# Patient Record
Sex: MALE | Race: OTHER | HISPANIC OR LATINO | Employment: FULL TIME | URBAN - METROPOLITAN AREA
[De-identification: names, ages, dates, MRNs, and addresses within clinical notes are randomized per-mention and may not be internally consistent; named-entity substitution may affect disease eponyms.]

---

## 2019-01-25 RX ORDER — GUANFACINE 1 MG/1
TABLET ORAL
COMMUNITY
End: 2019-01-28

## 2019-01-25 RX ORDER — METHYLPHENIDATE HYDROCHLORIDE 18 MG/1
TABLET ORAL
COMMUNITY
End: 2019-01-28 | Stop reason: CLARIF

## 2019-01-25 RX ORDER — KETOCONAZOLE 20 MG/G
CREAM TOPICAL DAILY
COMMUNITY
Start: 2013-07-02 | End: 2019-01-28

## 2019-01-28 ENCOUNTER — APPOINTMENT (OUTPATIENT)
Dept: LAB | Age: 24
End: 2019-01-28
Payer: COMMERCIAL

## 2019-01-28 ENCOUNTER — TRANSCRIBE ORDERS (OUTPATIENT)
Dept: ADMINISTRATIVE | Age: 24
End: 2019-01-28

## 2019-01-28 ENCOUNTER — OFFICE VISIT (OUTPATIENT)
Dept: INTERNAL MEDICINE CLINIC | Facility: CLINIC | Age: 24
End: 2019-01-28
Payer: COMMERCIAL

## 2019-01-28 VITALS
OXYGEN SATURATION: 98 % | DIASTOLIC BLOOD PRESSURE: 90 MMHG | WEIGHT: 209.4 LBS | HEART RATE: 88 BPM | HEIGHT: 71 IN | SYSTOLIC BLOOD PRESSURE: 110 MMHG | BODY MASS INDEX: 29.31 KG/M2 | TEMPERATURE: 98.1 F

## 2019-01-28 DIAGNOSIS — F84.0 AUTISM SPECTRUM DISORDER: ICD-10-CM

## 2019-01-28 DIAGNOSIS — Z01.84 IMMUNITY STATUS TESTING: ICD-10-CM

## 2019-01-28 DIAGNOSIS — K21.9 GASTROESOPHAGEAL REFLUX DISEASE, ESOPHAGITIS PRESENCE NOT SPECIFIED: ICD-10-CM

## 2019-01-28 DIAGNOSIS — E66.3 OVERWEIGHT: ICD-10-CM

## 2019-01-28 DIAGNOSIS — F90.2 ADHD (ATTENTION DEFICIT HYPERACTIVITY DISORDER), COMBINED TYPE: ICD-10-CM

## 2019-01-28 DIAGNOSIS — F31.63 BIPOLAR DISORDER, CURR EPISODE MIXED, SEVERE, W/O PSYCHOTIC FEATURES (HCC): ICD-10-CM

## 2019-01-28 DIAGNOSIS — R73.03 PREDIABETES: ICD-10-CM

## 2019-01-28 DIAGNOSIS — R73.03 PREDIABETES: Primary | ICD-10-CM

## 2019-01-28 PROBLEM — Z23 NEED FOR MMR VACCINE: Status: ACTIVE | Noted: 2019-01-28

## 2019-01-28 PROBLEM — Z76.89 ENCOUNTER TO ESTABLISH CARE: Status: ACTIVE | Noted: 2019-01-28

## 2019-01-28 LAB
ALBUMIN SERPL BCP-MCNC: 4.1 G/DL (ref 3.5–5)
ALP SERPL-CCNC: 67 U/L (ref 46–116)
ALT SERPL W P-5'-P-CCNC: 66 U/L (ref 12–78)
ANION GAP SERPL CALCULATED.3IONS-SCNC: 7 MMOL/L (ref 4–13)
AST SERPL W P-5'-P-CCNC: 25 U/L (ref 5–45)
BASOPHILS # BLD AUTO: 0.07 THOUSANDS/ΜL (ref 0–0.1)
BASOPHILS NFR BLD AUTO: 1 % (ref 0–1)
BILIRUB SERPL-MCNC: 0.45 MG/DL (ref 0.2–1)
BUN SERPL-MCNC: 17 MG/DL (ref 5–25)
CALCIUM SERPL-MCNC: 9.4 MG/DL (ref 8.3–10.1)
CHLORIDE SERPL-SCNC: 103 MMOL/L (ref 100–108)
CHOLEST SERPL-MCNC: 231 MG/DL (ref 50–200)
CO2 SERPL-SCNC: 27 MMOL/L (ref 21–32)
CREAT SERPL-MCNC: 1.05 MG/DL (ref 0.6–1.3)
EOSINOPHIL # BLD AUTO: 0.2 THOUSAND/ΜL (ref 0–0.61)
EOSINOPHIL NFR BLD AUTO: 3 % (ref 0–6)
ERYTHROCYTE [DISTWIDTH] IN BLOOD BY AUTOMATED COUNT: 11.9 % (ref 11.6–15.1)
EST. AVERAGE GLUCOSE BLD GHB EST-MCNC: 140 MG/DL
GFR SERPL CREATININE-BSD FRML MDRD: 101 ML/MIN/1.73SQ M
GLUCOSE P FAST SERPL-MCNC: 111 MG/DL (ref 65–99)
HBA1C MFR BLD: 6.5 % (ref 4.2–6.3)
HCT VFR BLD AUTO: 44.2 % (ref 36.5–49.3)
HDLC SERPL-MCNC: 44 MG/DL (ref 40–60)
HGB BLD-MCNC: 15 G/DL (ref 12–17)
IMM GRANULOCYTES # BLD AUTO: 0.01 THOUSAND/UL (ref 0–0.2)
IMM GRANULOCYTES NFR BLD AUTO: 0 % (ref 0–2)
LDLC SERPL CALC-MCNC: 157 MG/DL (ref 0–100)
LYMPHOCYTES # BLD AUTO: 1.99 THOUSANDS/ΜL (ref 0.6–4.47)
LYMPHOCYTES NFR BLD AUTO: 33 % (ref 14–44)
MCH RBC QN AUTO: 29.9 PG (ref 26.8–34.3)
MCHC RBC AUTO-ENTMCNC: 33.9 G/DL (ref 31.4–37.4)
MCV RBC AUTO: 88 FL (ref 82–98)
MONOCYTES # BLD AUTO: 0.59 THOUSAND/ΜL (ref 0.17–1.22)
MONOCYTES NFR BLD AUTO: 10 % (ref 4–12)
NEUTROPHILS # BLD AUTO: 3.17 THOUSANDS/ΜL (ref 1.85–7.62)
NEUTS SEG NFR BLD AUTO: 53 % (ref 43–75)
NONHDLC SERPL-MCNC: 187 MG/DL
NRBC BLD AUTO-RTO: 0 /100 WBCS
PLATELET # BLD AUTO: 253 THOUSANDS/UL (ref 149–390)
PMV BLD AUTO: 10.7 FL (ref 8.9–12.7)
POTASSIUM SERPL-SCNC: 3.9 MMOL/L (ref 3.5–5.3)
PROT SERPL-MCNC: 7.8 G/DL (ref 6.4–8.2)
RBC # BLD AUTO: 5.01 MILLION/UL (ref 3.88–5.62)
SODIUM SERPL-SCNC: 137 MMOL/L (ref 136–145)
TRIGL SERPL-MCNC: 152 MG/DL
TSH SERPL DL<=0.05 MIU/L-ACNC: 2.88 UIU/ML (ref 0.36–3.74)
WBC # BLD AUTO: 6.03 THOUSAND/UL (ref 4.31–10.16)

## 2019-01-28 PROCEDURE — 86704 HEP B CORE ANTIBODY TOTAL: CPT

## 2019-01-28 PROCEDURE — 80061 LIPID PANEL: CPT

## 2019-01-28 PROCEDURE — 85025 COMPLETE CBC W/AUTO DIFF WBC: CPT

## 2019-01-28 PROCEDURE — 84443 ASSAY THYROID STIM HORMONE: CPT

## 2019-01-28 PROCEDURE — 83036 HEMOGLOBIN GLYCOSYLATED A1C: CPT

## 2019-01-28 PROCEDURE — 86787 VARICELLA-ZOSTER ANTIBODY: CPT

## 2019-01-28 PROCEDURE — 99204 OFFICE O/P NEW MOD 45 MIN: CPT | Performed by: NURSE PRACTITIONER

## 2019-01-28 PROCEDURE — 86762 RUBELLA ANTIBODY: CPT

## 2019-01-28 PROCEDURE — 86765 RUBEOLA ANTIBODY: CPT

## 2019-01-28 PROCEDURE — 1036F TOBACCO NON-USER: CPT | Performed by: NURSE PRACTITIONER

## 2019-01-28 PROCEDURE — 86706 HEP B SURFACE ANTIBODY: CPT

## 2019-01-28 PROCEDURE — 3008F BODY MASS INDEX DOCD: CPT | Performed by: NURSE PRACTITIONER

## 2019-01-28 PROCEDURE — 80053 COMPREHEN METABOLIC PANEL: CPT

## 2019-01-28 PROCEDURE — 86735 MUMPS ANTIBODY: CPT

## 2019-01-28 PROCEDURE — 36415 COLL VENOUS BLD VENIPUNCTURE: CPT

## 2019-01-28 RX ORDER — ATOMOXETINE 40 MG/1
40 CAPSULE ORAL DAILY
Qty: 30 CAPSULE | Refills: 0 | Status: SHIPPED | OUTPATIENT
Start: 2019-01-28

## 2019-01-28 RX ORDER — RANITIDINE 150 MG/1
150 CAPSULE ORAL 2 TIMES DAILY
Qty: 60 CAPSULE | Refills: 0 | Status: SHIPPED | OUTPATIENT
Start: 2019-01-28

## 2019-01-28 RX ORDER — OLANZAPINE 20 MG/1
20 TABLET ORAL
COMMUNITY
End: 2019-01-28 | Stop reason: SDUPTHER

## 2019-01-28 RX ORDER — OLANZAPINE 20 MG/1
20 TABLET ORAL
Qty: 30 TABLET | Refills: 0 | Status: SHIPPED | OUTPATIENT
Start: 2019-01-28

## 2019-01-28 RX ORDER — OMEPRAZOLE 20 MG/1
20 CAPSULE, DELAYED RELEASE ORAL DAILY
Qty: 90 CAPSULE | Refills: 1 | Status: CANCELLED | OUTPATIENT
Start: 2019-01-28

## 2019-01-28 RX ORDER — OLANZAPINE 20 MG/1
20 TABLET ORAL
COMMUNITY
Start: 2016-07-18 | End: 2019-01-28

## 2019-01-28 RX ORDER — OMEPRAZOLE 20 MG/1
20 CAPSULE, DELAYED RELEASE ORAL DAILY
COMMUNITY
Start: 2018-03-27

## 2019-01-28 RX ORDER — ATOMOXETINE 40 MG/1
40 CAPSULE ORAL DAILY
COMMUNITY
Start: 2016-07-18 | End: 2019-01-28 | Stop reason: SDUPTHER

## 2019-01-28 NOTE — PATIENT INSTRUCTIONS
Will check labs  Refill meds    ADHD/Bipolar/Autism  Will help facilitate psych referral with  insurance  Continue with counseling     Pre-Diabetes  Will update labs  Will likely need to re-start metformin  Work on reducing sugar - sweets, soda, cookies    Reflux  Will stop omeprazole  Start zantac  Recommend small frequent meals throughout the day  Avoid aggravating foods - spicy foods, acidic foods - such as tomato and citrus  Avoid alcohol  Do not lay down for at least 30 mins after eating

## 2019-01-28 NOTE — PROGRESS NOTES
Assessment/Plan:    Pt is new to our practice  Here to establish care  He is brought in with his mother  He has a PMH of Autism, Bipolar, ADHD, pre-dm    ADHD/Bipolar/Autism  Stable  Will refill straterra and zyprexa temporarily until pt can be seen by Psych  Will help facilitate psych referral with  insurance  Continue with counseling     Pre-Diabetes  Last A1c 6 2  Has been off metformin for > 4 months  Will update labs  Will likely need to re-start metformin  Diet is poor  D/W pt and mom to work on reducing sugar - sweets, soda, cookies  Referral to DM nutritionist ordered  D/W pt and mom need to exercise    Reflux  Will stop omeprazole  Start zantac  Recommend small frequent meals throughout the day  Avoid aggravating foods - spicy foods, acidic foods - such as tomato and citrus  Avoid alcohol  Do not lay down for at least 30 mins after eating  Mom states the patient was previously not immune to his childhood vaccines require boosters  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 [facility]  Will update the titers at patient/mom request     Patient to follow up in one month to review blood work  Diagnoses and all orders for this visit:    Prediabetes  -     CBC and differential; Future  -     Comprehensive metabolic panel; Future  -     Lipid panel; Future  -     Hemoglobin A1C; Future  -     TSH, 3rd generation with Free T4 reflex; Future  -     Ambulatory referral to medical nutrition therapy for diabetes; Future    Overweight  -     Ambulatory referral to medical nutrition therapy for diabetes; Future    Autism spectrum disorder  -     Ambulatory referral to Psychiatry; Future    Gastroesophageal reflux disease, esophagitis presence not specified  -     ranitidine (ZANTAC) 150 MG capsule; Take 1 capsule (150 mg total) by mouth 2 (two) times a day    Immunity status testing  -     Measles/Mumps/Rubella Immunity;  Future  -     Varicella zoster antibody, IgG; Future  -     Hepatitis B Immunity Panel; Future    Bipolar disorder, curr episode mixed, severe, w/o psychotic features (HCC)  -     OLANZapine (ZyPREXA) 20 MG tablet; Take 1 tablet (20 mg total) by mouth daily at bedtime  -     Ambulatory referral to Psychiatry; Future    ADHD (attention deficit hyperactivity disorder), combined type  -     atoMOXetine (STRATTERA) 40 mg capsule; Take 1 capsule (40 mg total) by mouth daily  -     Ambulatory referral to Psychiatry; Future    Other orders  -     Discontinue: atoMOXetine (STRATTERA) 40 mg capsule; Take 40 mg by mouth daily    -     Discontinue: OLANZapine (ZyPREXA) 20 MG tablet; Take 20 mg by mouth daily at bedtime    -     omeprazole (PriLOSEC) 20 mg delayed release capsule; Take 20 mg by mouth daily    -     Discontinue: OLANZapine (ZyPREXA) 20 MG tablet; Take 20 mg by mouth daily at bedtime  -     Cancel: omeprazole (PriLOSEC) 20 mg delayed release capsule; Take 1 capsule (20 mg total) by mouth daily        Subjective:      Patient ID: Teddy Brink is a 26 y o  male  HPI    Patient presents to [facility] care  He was previously seen by [facility]  Patient is a past medical history of pre diabetes  His last A1c was 6 2on 04/2018  Patient has not been seen by his PCP since that time  He ran out of metformin approx 4 month  Patient does not exercise on a routine basis  His diet is poor  Patient/his mother admits that he will have close for breakfast   Eat for off and of a can, frequent chips, soda  Patient's mother does the grocery shopping however he will sneak food into the house  He also has a [facility] hx of ADHD, autistic disorder, bipolar  He has been following with Dr Gavin Roca, however due to insurance changes has to change psychiatry practice  He is due for med refills  Patient is currently going to counseling as well  Mom states the patient was previously not immune to his childhood vaccines require boosters    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 [facility]  Will update the titers at patient/mom request     The following portions of the patient's history were reviewed and updated as appropriate: allergies, current medications, past family history, past medical history, past social history, past surgical history and problem list     Review of Systems   Constitutional: Negative for appetite change, chills, fatigue, fever and unexpected weight change  HENT: Negative for congestion, sinus pain, sinus pressure and sore throat  Respiratory: Negative for cough, chest tightness, shortness of breath and wheezing  Cardiovascular: Negative for chest pain, palpitations and leg swelling  Gastrointestinal: Negative for abdominal pain, constipation, diarrhea, nausea and vomiting  Neurological: Negative for dizziness, syncope, light-headedness and headaches  Psychiatric/Behavioral: Positive for decreased concentration and sleep disturbance  Negative for behavioral problems, confusion, dysphoric mood and self-injury  The patient is not nervous/anxious            Past Medical History:   Diagnosis Date    ADHD (attention deficit hyperactivity disorder)     Anxiety     Foot fracture     GERD (gastroesophageal reflux disease)     Migraine          Current Outpatient Prescriptions:     atoMOXetine (STRATTERA) 40 mg capsule, Take 1 capsule (40 mg total) by mouth daily, Disp: 30 capsule, Rfl: 0    OLANZapine (ZyPREXA) 20 MG tablet, Take 1 tablet (20 mg total) by mouth daily at bedtime, Disp: 30 tablet, Rfl: 0    omeprazole (PriLOSEC) 20 mg delayed release capsule, Take 20 mg by mouth daily  , Disp: , Rfl:     ranitidine (ZANTAC) 150 MG capsule, Take 1 capsule (150 mg total) by mouth 2 (two) times a day, Disp: 60 capsule, Rfl: 0    Allergies   Allergen Reactions    Banana Anaphylaxis    Methylphenidate Rash    Pollen Extract Allergic Rhinitis       Social History   Past Surgical History: Procedure Laterality Date    WISDOM TOOTH EXTRACTION       Family History   Problem Relation Age of Onset    Coronary artery disease Family     Diabetes Family     Hyperlipidemia Family     Hypertension Family     Migraines Mother     Diabetes Father     Diabetes Paternal Grandmother     Hypertension Paternal Grandmother     Diabetes Paternal Grandfather     Hypertension Paternal Grandfather        Objective:  /90 (BP Location: Left arm, Patient Position: Sitting, Cuff Size: Large)   Pulse 88   Temp 98 1 °F (36 7 °C) (Oral)   Ht 5' 11" (1 803 m)   Wt 95 kg (209 lb 6 4 oz)   SpO2 98%   BMI 29 21 kg/m²      Physical Exam   Constitutional: He is oriented to person, place, and time  He appears well-developed and well-nourished  No distress  HENT:   Head: Normocephalic and atraumatic  Right Ear: Hearing, tympanic membrane, external ear and ear canal normal    Left Ear: Hearing, tympanic membrane, external ear and ear canal normal    Nose: Nose normal  No mucosal edema or rhinorrhea  Mouth/Throat: Uvula is midline, oropharynx is clear and moist and mucous membranes are normal  No oropharyngeal exudate  Cardiovascular: Normal rate and regular rhythm  Pulmonary/Chest: Effort normal and breath sounds normal  No respiratory distress  He has no wheezes  Lymphadenopathy:     He has no cervical adenopathy  Neurological: He is alert and oriented to person, place, and time  Skin: Skin is warm and dry  Psychiatric: He has a normal mood and affect  His behavior is normal  Judgment and thought content normal    Pleasant, cooperative   Nursing note and vitals reviewed

## 2019-01-29 LAB
HBV CORE AB SER QL: NORMAL
HBV SURFACE AB SER-ACNC: 44.96 MIU/ML

## 2019-01-31 ENCOUNTER — TELEPHONE (OUTPATIENT)
Dept: INTERNAL MEDICINE CLINIC | Facility: CLINIC | Age: 24
End: 2019-01-31

## 2019-01-31 DIAGNOSIS — E11.9 TYPE 2 DIABETES MELLITUS WITHOUT COMPLICATION, WITHOUT LONG-TERM CURRENT USE OF INSULIN (HCC): ICD-10-CM

## 2019-01-31 DIAGNOSIS — Z01.84 IMMUNITY TO VARICELLA DETERMINED BY SEROLOGIC TEST: Primary | ICD-10-CM

## 2019-01-31 DIAGNOSIS — Z01.84 IMMUNITY STATUS TESTING: ICD-10-CM

## 2019-01-31 LAB
MISCELLANEOUS LAB TEST RESULT: NORMAL
VZV IGG SER IA-ACNC: ABNORMAL

## 2019-02-08 ENCOUNTER — TELEPHONE (OUTPATIENT)
Dept: INTERNAL MEDICINE CLINIC | Facility: CLINIC | Age: 24
End: 2019-02-08

## 2019-02-08 NOTE — TELEPHONE ENCOUNTER
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

## 2019-02-08 NOTE — TELEPHONE ENCOUNTER
Patient's mother called and would like something in writing stating patient can't be retested for chicken pox  Patient would also like something in writing stating that patient has a condition where vaccinations do not remain in his system  Patient is concerned about being suspended by employer and has a meeting to discuss at 2pm today  Please advise

## 2019-02-11 ENCOUNTER — APPOINTMENT (OUTPATIENT)
Dept: LAB | Age: 24
End: 2019-02-11
Payer: COMMERCIAL

## 2019-02-11 DIAGNOSIS — Z01.84 IMMUNITY STATUS TESTING: ICD-10-CM

## 2019-02-11 DIAGNOSIS — Z01.84 IMMUNITY TO VARICELLA DETERMINED BY SEROLOGIC TEST: ICD-10-CM

## 2019-02-11 PROCEDURE — 86787 VARICELLA-ZOSTER ANTIBODY: CPT

## 2019-02-11 PROCEDURE — 36415 COLL VENOUS BLD VENIPUNCTURE: CPT

## 2019-02-13 ENCOUNTER — TELEPHONE (OUTPATIENT)
Dept: INTERNAL MEDICINE CLINIC | Facility: CLINIC | Age: 24
End: 2019-02-13

## 2019-02-13 ENCOUNTER — TELEPHONE (OUTPATIENT)
Dept: PSYCHIATRY | Facility: CLINIC | Age: 24
End: 2019-02-13

## 2019-02-13 LAB — VZV IGG SER IA-ACNC: NORMAL

## 2019-02-13 NOTE — TELEPHONE ENCOUNTER
Behavorial Health Outpatient Intake Questions    Referred by: Dr General Gaytan, [address] [facility] ([facility]) with provider before scheduling    Are there any developmental disabilities? Autistic    Does the patient have hearing impairment? No    Does the patient have ICM or CTT? TIP program    Taking injectable psychiatric medications? NoIf yes, patient can not be seen here  Has the patient ever seen or currently see a psychiatrist? Yes If yes who/when? Dr Harsh Griffiths, [facility], 2 months ago    Has the patient ever seen or currently see a therapist? Yes If yes who/when? Lizet @ [facility]    How many visits did the pt have for previous psychiatric treatment?  History    Has the patient served in the [place] [address]? No    If yes, have you had combat services? No    Was the patient activated into federal active duty as a member of the national guard or reserve? No    Minor Child    Who has custody of the child? Is there a custody agreement? If there is a custody agreement remind parent that they must bring a copy to the first appt or they will not be seen  [facility]     Presenting Problem (in patient's words) Autstic ADHD, Bipolar, med management, High Functioning   Substance Abuse:No concerns of substance abuse are reported  Has the patient been seen here previously, either inpatient or outpatient? No outpatient    If seen as outpatient, what provider(s) did the patient see? A member of the patient's family has been in therapy here with     ACCEPTED as a patient Yes Appointment Date: Dr Earl Urban 5/29/19 @ 2  Referred Elsewhere?  No    Primary Care Physician: Eboni Bhat, [address]     PCP telephone number: [phone]    [address] [facility]  ----------------------------------------------------------------------------------------------------------------------    Insurance subscriber:     alexandra [employer];    Address:                                                        Phone:                                   SSN:    Employer:  [employer]                                                   Address:  ----------------------------------------------------------------------------------------------------------------------    Primary Insurance:    cbc                                                               Phone:    ID number:     [id number]                                              Group number:[id number]  ----------------------------------------------------------------------------------------------------------------------    Secondary Insurance:                                                               Phone:    ID number:                                                   Group number:  ----------------------------------------------------------------------------------------------------------------------    Other insurance information:             _______________________________________

## 2019-02-21 DIAGNOSIS — R73.03 PREDIABETES: ICD-10-CM

## 2019-02-21 DIAGNOSIS — E66.3 OVERWEIGHT: Primary | ICD-10-CM

## 2019-02-25 DIAGNOSIS — E11.9 TYPE 2 DIABETES MELLITUS WITHOUT COMPLICATION, WITHOUT LONG-TERM CURRENT USE OF INSULIN (HCC): Primary | ICD-10-CM

## 2019-02-25 DIAGNOSIS — E66.3 OVERWEIGHT: ICD-10-CM

## 2019-02-25 PROBLEM — R73.03 PREDIABETES: Status: RESOLVED | Noted: 2018-04-27 | Resolved: 2019-02-25

## 2019-04-09 ENCOUNTER — OFFICE VISIT (OUTPATIENT)
Dept: URGENT CARE | Age: 24
End: 2019-04-09
Payer: COMMERCIAL

## 2019-04-09 VITALS
RESPIRATION RATE: 16 BRPM | OXYGEN SATURATION: 97 % | HEART RATE: 98 BPM | BODY MASS INDEX: 28.7 KG/M2 | SYSTOLIC BLOOD PRESSURE: 117 MMHG | WEIGHT: 205 LBS | TEMPERATURE: 97.7 F | HEIGHT: 71 IN | DIASTOLIC BLOOD PRESSURE: 75 MMHG

## 2019-04-09 DIAGNOSIS — Z02.4 DRIVER'S PERMIT PE (PHYSICAL EXAMINATION): Primary | ICD-10-CM

## 2019-06-20 ENCOUNTER — TELEPHONE (OUTPATIENT)
Dept: INTERNAL MEDICINE CLINIC | Facility: CLINIC | Age: 24
End: 2019-06-20

## 2020-04-07 ENCOUNTER — TELEPHONE (OUTPATIENT)
Dept: INTERNAL MEDICINE CLINIC | Facility: CLINIC | Age: 25
End: 2020-04-07

## 2020-07-09 ENCOUNTER — EVALUATION (OUTPATIENT)
Dept: PHYSICAL THERAPY | Age: 25
End: 2020-07-09
Payer: COMMERCIAL

## 2020-07-09 DIAGNOSIS — S39.012D LUMBAR STRAIN, SUBSEQUENT ENCOUNTER: Primary | ICD-10-CM

## 2020-07-09 PROCEDURE — 97161 PT EVAL LOW COMPLEX 20 MIN: CPT | Performed by: PHYSICAL THERAPIST

## 2020-07-09 PROCEDURE — 97110 THERAPEUTIC EXERCISES: CPT | Performed by: PHYSICAL THERAPIST

## 2020-07-09 NOTE — PROGRESS NOTES
PT Evaluation     Today's date: 2020  Patient name: Melanie Neville  : 1995  MRN: [id number]  Referring provider: Mary Henderson MD  Dx:   Encounter Diagnosis     ICD-10-CM    1  Lumbar strain, subsequent encounter S39 012D                   Assessment  Assessment details: Extension bias vs muscular strain - patient presents with decreased AROM, PROM, weakness, tenderness to palpation, and pain  Impairments: abnormal muscle tone, abnormal or restricted ROM, impaired physical strength and lacks appropriate home exercise program    Goals  Short Term goals - 4 weeks  1  Patient will be independent HEP  2   Patient will report a 50% decrease in pain complaints  3   Increase strength 1/2 grade  4   Increase ROM 5-10 degrees  Long Term goals - 8 weeks  1  Patient will report elimination of pain complaints  2   Patient will return to all work related activities without restriction  3   Patient will return to all recreational activities without restriction  4   ROM WFL  5   Strength 5/5  Plan  Planned therapy interventions: manual therapy, strengthening and stretching  Frequency: 2x week  Duration in weeks: 4        Subjective Evaluation    History of Present Illness  Mechanism of injury: Patient reports injuring LB appropriately 4 weeks ago - 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  Patient is a CNA  Current sx's:  Central LBP - aggravated by lifting, lying supine/sidelying, and sitting  Patient is still working normal duty full time  Neg previous history  Neg diagnostic testing  MEDS - pain med and muscle relaxer  Job duties require multiple positions and requirements      Quality of life: good    Pain  Current pain ratin  At best pain ratin  Quality: throbbing, sharp and tight  Aggravating factors: standing, walking, lifting and sitting  Progression: no change    Patient Goals  Patient goals for therapy: increased strength, independence with ADLs/IADLs, increased motion and decreased pain          Objective     Active Range of Motion     Lumbar   Flexion:  Restriction level: minimal  Extension:  Restriction level: minimal  Left lateral flexion:  with pain Restriction level: moderate  Right lateral flexion:  with pain Restriction level: moderate    Additional Active Range of Motion Details  Extension bias    Strength/Myotome Testing     Lumbar   Left   Normal strength    Right   Normal strength    Tests     Lumbar     Left   Positive passive SLR  Negative femoral stretch and slump test      Right   Positive passive SLR  Negative femoral stretch and slump test      Left Hip   Negative SUSSY and FADIR  Right Hip   Negative SUSSY and FADIR                Precautions: None      Manuals                                                                 Neuro Re-Ed                                                                                                        Ther Ex             Assess HEP                                                                                                        Ther Activity                                       Gait Training                                       Modalities

## 2020-07-13 ENCOUNTER — OFFICE VISIT (OUTPATIENT)
Dept: PHYSICAL THERAPY | Age: 25
End: 2020-07-13
Payer: COMMERCIAL

## 2020-07-13 DIAGNOSIS — S39.012D LUMBAR STRAIN, SUBSEQUENT ENCOUNTER: Primary | ICD-10-CM

## 2020-07-13 PROCEDURE — 97110 THERAPEUTIC EXERCISES: CPT | Performed by: PHYSICAL THERAPIST

## 2020-07-13 NOTE — PROGRESS NOTES
Daily Note     Today's date: 2020  Patient name: Lydia Akins  : 1995  MRN: [id number]  Referring provider: Dayna Hardin MD  Dx:   Encounter Diagnosis     ICD-10-CM    1  Lumbar strain, subsequent encounter S39 012D                   Subjective: Better with extension program      Objective: See treatment diary below      Assessment: Tolerated treatment well  Patient would benefit from continued PT      Plan: Progress note during next visit        Precautions: None      Manuals                                                                 Neuro Re-Ed                                                                                                        Ther Ex             Assess HEP completed            TM 8 minutes            Prone press ups  2x10            Prone alt 2x10            Prone trunk ext 2x10            Jorgito - rotation 2x10                                      Ther Activity                                       Gait Training                                       Modalities

## 2020-07-15 ENCOUNTER — APPOINTMENT (OUTPATIENT)
Dept: PHYSICAL THERAPY | Age: 25
End: 2020-07-15
Payer: COMMERCIAL

## 2020-07-20 ENCOUNTER — OFFICE VISIT (OUTPATIENT)
Dept: PHYSICAL THERAPY | Age: 25
End: 2020-07-20
Payer: COMMERCIAL

## 2020-07-20 DIAGNOSIS — S39.012D LUMBAR STRAIN, SUBSEQUENT ENCOUNTER: Primary | ICD-10-CM

## 2020-07-20 PROCEDURE — 97012 MECHANICAL TRACTION THERAPY: CPT | Performed by: PHYSICAL THERAPIST

## 2020-07-20 NOTE — PROGRESS NOTES
Daily Note     Today's date: 2020  Patient name: Black Sommer  : 1995  MRN: [id number]  Referring provider: Audrey Wilson MD  Dx:   Encounter Diagnosis     ICD-10-CM    1  Lumbar strain, subsequent encounter S39 012D                   Subjective: Patient reports no change in overall sx's  Objective: See treatment diary below      Assessment: Tolerated treatment well  Patient would benefit from continued PT      Plan: Continue per plan of care  Precautions: None      Manuals                                                                Neuro Re-Ed                                                                                                        Ther Ex             Assess HEP completed            TM 8 minutes 10 minutes           Prone press ups  2x10 2x10           Prone alt 2x10 2x10           Prone trunk ext 2x10 increased pain noted             Jorgito - rotation 2x10 2x10                                     Ther Activity                                       Gait Training                                       Modalities             Mechanical traction  90#/20# - 15 minutes prone

## 2020-07-29 ENCOUNTER — APPOINTMENT (OUTPATIENT)
Dept: PHYSICAL THERAPY | Age: 25
End: 2020-07-29
Payer: COMMERCIAL

## 2021-04-13 ENCOUNTER — HOSPITAL ENCOUNTER (EMERGENCY)
Facility: HOSPITAL | Age: 26
Discharge: HOME | End: 2021-04-14
Attending: EMERGENCY MEDICINE | Admitting: EMERGENCY MEDICINE
Payer: OTHER MISCELLANEOUS

## 2021-04-13 VITALS
TEMPERATURE: 97.1 F | RESPIRATION RATE: 18 BRPM | DIASTOLIC BLOOD PRESSURE: 80 MMHG | WEIGHT: 191 LBS | HEART RATE: 84 BPM | OXYGEN SATURATION: 96 % | SYSTOLIC BLOOD PRESSURE: 141 MMHG | BODY MASS INDEX: 26.64 KG/M2

## 2021-04-13 DIAGNOSIS — M54.9 BACK PAIN: Primary | ICD-10-CM

## 2021-04-13 PROCEDURE — 99283 EMERGENCY DEPT VISIT LOW MDM: CPT

## 2021-04-13 PROCEDURE — 99284 EMERGENCY DEPT VISIT MOD MDM: CPT | Performed by: EMERGENCY MEDICINE

## 2021-04-13 RX ORDER — ACETAMINOPHEN 325 MG/1
975 TABLET ORAL ONCE
Status: COMPLETED | OUTPATIENT
Start: 2021-04-13 | End: 2021-04-13

## 2021-04-13 RX ORDER — METHOCARBAMOL 500 MG/1
1000 TABLET, FILM COATED ORAL 4 TIMES DAILY
Qty: 40 TABLET | Refills: 0 | Status: SHIPPED | OUTPATIENT
Start: 2021-04-13 | End: 2021-04-18

## 2021-04-13 RX ORDER — LIDOCAINE 50 MG/G
1 PATCH TOPICAL ONCE
Status: DISCONTINUED | OUTPATIENT
Start: 2021-04-14 | End: 2021-04-14 | Stop reason: HOSPADM

## 2021-04-13 RX ADMIN — ACETAMINOPHEN 975 MG: 325 TABLET, FILM COATED ORAL at 23:49

## 2021-04-13 NOTE — Clinical Note
Jerrell Harris was seen and treated in our emergency department on 4/13/2021  light lifting as tolerated    Diagnosis:     Mattie Pryor    He may return on this date: If you have any questions or concerns, please don't hesitate to call        Alan Johnson DO    ______________________________           _______________          _______________  Hospital Representative                              Date                                Time

## 2021-04-13 NOTE — Clinical Note
Oksana Mccollum was seen and treated in our emergency department on 4/13/2021  light lifting as tolerated    Diagnosis:     Ashleighlala Tafoya    He may return on this date: If you have any questions or concerns, please don't hesitate to call        Jessica Puri DO    ______________________________           _______________          _______________  Hospital Representative                              Date                                Time

## 2021-04-14 RX ADMIN — LIDOCAINE 1 PATCH: 50 PATCH TOPICAL at 00:02

## 2021-04-14 NOTE — ED PROVIDER NOTES
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 Taking?    OLANZapine (ZyPREXA) 20 MG tablet   No Yes   Sig: Take 1 tablet (20 mg total) by mouth daily at bedtime   atoMOXetine (STRATTERA) 40 mg capsule   No Yes   Sig: Take 1 capsule (40 mg total) by mouth daily   metFORMIN (GLUCOPHAGE) 500 mg tablet   No Yes   Sig: Take 1 tablet (500 mg total) by mouth 2 (two) times a day with meals   omeprazole (PriLOSEC) 20 mg delayed release capsule   Yes Yes   Sig: Take 20 mg by mouth daily     ranitidine (ZANTAC) 150 MG capsule   No Yes   Sig: Take 1 capsule (150 mg total) by mouth 2 (two) times a day      Facility-Administered Medications: None       Past Medical History:   Diagnosis Date    ADHD (attention deficit hyperactivity disorder)     Anxiety     Diabetes mellitus (HCC)     Type 2    Foot fracture     GERD (gastroesophageal reflux disease)     Migraine        Past Surgical History:   Procedure Laterality Date    WISDOM TOOTH EXTRACTION         Family History   Problem Relation Age of Onset    Coronary artery disease Family     Diabetes Family     Hyperlipidemia Family     Hypertension Family     Migraines Mother     Diabetes Father     Diabetes Paternal Grandmother     Hypertension Paternal Grandmother     Diabetes Paternal Grandfather     Hypertension Paternal Grandfather      I have reviewed and agree with the history as documented  E-Cigarette/Vaping     E-Cigarette/Vaping Substances     Social History     Tobacco Use    Smoking status: Never Smoker    Smokeless tobacco: Never Used   Substance Use Topics    Alcohol use: No    Drug use: No        Review of Systems   Constitutional: Negative for chills and fever  HENT: Negative for ear pain and sore throat  Eyes: Negative for visual disturbance  Respiratory: Negative for cough and shortness of breath  Cardiovascular: Negative for chest pain and palpitations  Gastrointestinal: Negative for abdominal pain and vomiting  Genitourinary: Negative for frequency  Musculoskeletal: Positive for back pain  Negative for arthralgias  Skin: Negative for color change and rash  Neurological: Negative for syncope  All other systems reviewed and are negative  Physical Exam  ED Triage Vitals   Temperature Pulse Respirations Blood Pressure SpO2   04/13/21 2159 04/13/21 2159 04/13/21 2159 04/13/21 2159 04/13/21 2159   (!) 97 1 °F (36 2 °C) 84 18 141/80 96 %      Temp Source Heart Rate Source Patient Position - Orthostatic VS BP Location FiO2 (%)   04/13/21 2159 04/13/21 2159 04/13/21 2159 04/13/21 2159 --   Tympanic Monitor Standing Left arm       Pain Score       04/13/21 2349       7             Orthostatic Vital Signs  Vitals:    04/13/21 2159   BP: 141/80   Pulse: 84   Patient Position - Orthostatic VS: Standing       Physical Exam  Vitals signs and nursing note reviewed  Constitutional:       Appearance: He is well-developed  HENT:      Head: Normocephalic and atraumatic  Eyes:      Conjunctiva/sclera: Conjunctivae normal    Neck:      Musculoskeletal: Neck supple     Cardiovascular:      Rate and Rhythm: Normal rate and regular rhythm  Heart sounds: No murmur  Pulmonary:      Effort: Pulmonary effort is normal  No respiratory distress  Breath sounds: Normal breath sounds  Abdominal:      Palpations: Abdomen is soft  Tenderness: There is no abdominal tenderness  Musculoskeletal: Normal range of motion  General: Tenderness present  No swelling or deformity  Comments: Bilateral paraspinal lumbar tenderness   Skin:     General: Skin is warm and dry  Neurological:      Mental Status: He is alert and oriented to person, place, and time  Psychiatric:         Mood and Affect: Mood normal          Behavior: Behavior normal          Thought Content: Thought content normal          Judgment: Judgment normal          ED Medications  Medications   lidocaine (LIDODERM) 5 % patch 1 patch (1 patch Topical Medication Applied 4/14/21 0002)   acetaminophen (TYLENOL) tablet 975 mg (975 mg Oral Given 4/13/21 2349)       Diagnostic Studies  Results Reviewed     None                 No orders to display         Procedures  Procedures      ED Course                             SBIRT 20yo+      Most Recent Value   SBIRT (25 yo +)   In order to provide better care to our patients, we are screening all of our patients for alcohol and drug use  Would it be okay to ask you these screening questions? No Filed at: 04/13/2021 2327                MDM  Number of Diagnoses or Management Options  Back pain:   Diagnosis management comments: Patient was offered trigger point injections declined  Declined IM Toradol injection  Provided oral Tylenol and I wrote a script for Robaxin of present the patient's pharmacy, as patient will be driving today  Provided note for work  Patient does not have any red flag symptoms such as saddle anesthesia urinary or bowel retention, radicular pain or numbness or tingling in extremities  Safe for discharge at this time    Discharged with return precautions      Disposition  Final diagnoses:   Back pain     Time reflects when diagnosis was documented in both MDM as applicable and the Disposition within this note     Time User Action Codes Description Comment    4/13/2021 11:36 PM Shawnee Palmer Add [M54 9] Back pain       ED Disposition     ED Disposition Condition Date/Time Comment    Discharge Stable Tue Apr 13, 2021 11:58 PM Domonique Beasley discharge to home/self care  Follow-up Information     Follow up With Specialties Details Why Contact Info Additional Information    Damaris Cm MD Internal Medicine   [address] [facility] [address]  [phone]       [facility]   [phone]          Discharge Medication List as of 4/14/2021 12:00 AM      START taking these medications    Details   methocarbamol (ROBAXIN) 500 mg tablet Take 2 tablets (1,000 mg total) by mouth 4 (four) times a day for 5 days, Starting Tue 4/13/2021, Until Sun 4/18/2021, Normal         CONTINUE these medications which have NOT CHANGED    Details   atoMOXetine (STRATTERA) 40 mg capsule Take 1 capsule (40 mg total) by mouth daily, Starting Mon 1/28/2019, Normal      metFORMIN (GLUCOPHAGE) 500 mg tablet Take 1 tablet (500 mg total) by mouth 2 (two) times a day with meals, Starting Thu 1/31/2019, Normal      OLANZapine (ZyPREXA) 20 MG tablet Take 1 tablet (20 mg total) by mouth daily at bedtime, Starting Mon 1/28/2019, Normal      omeprazole (PriLOSEC) 20 mg delayed release capsule Take 20 mg by mouth daily  , Starting Tue 3/27/2018, Historical Med      ranitidine (ZANTAC) 150 MG capsule Take 1 capsule (150 mg total) by mouth 2 (two) times a day, Starting Mon 1/28/2019, Normal           No discharge procedures on file  PDMP Review     None           ED Provider  Attending physically available and evaluated Domonique Beasley I managed the patient along with the ED Attending      Electronically Signed by Archana Fay,   04/14/21 0120

## 2021-04-14 NOTE — ED ATTENDING ATTESTATION
4/13/2021  IBella MD, saw and evaluated the patient  I have discussed the patient with the resident/non-physician practitioner and agree with the resident's/non-physician practitioner's findings, Plan of Care, and MDM as documented in the resident's/non-physician practitioner's note, except where noted  All available labs and Radiology studies were reviewed  I was present for key portions of any procedure(s) performed by the resident/non-physician practitioner and I was immediately available to provide assistance  At this point I agree with the current assessment done in the Emergency Department  I have conducted an independent evaluation of this patient a history and physical is as follows:  70-year-old male here with back pain  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

## 2021-07-12 ENCOUNTER — APPOINTMENT (EMERGENCY)
Dept: RADIOLOGY | Facility: HOSPITAL | Age: 26
End: 2021-07-12
Payer: COMMERCIAL

## 2021-07-12 ENCOUNTER — HOSPITAL ENCOUNTER (EMERGENCY)
Facility: HOSPITAL | Age: 26
Discharge: HOME | End: 2021-07-13
Attending: EMERGENCY MEDICINE | Admitting: EMERGENCY MEDICINE
Payer: COMMERCIAL

## 2021-07-12 ENCOUNTER — APPOINTMENT (EMERGENCY)
Dept: CT IMAGING | Facility: HOSPITAL | Age: 26
End: 2021-07-12
Payer: COMMERCIAL

## 2021-07-12 VITALS
HEART RATE: 52 BPM | OXYGEN SATURATION: 100 % | RESPIRATION RATE: 20 BRPM | SYSTOLIC BLOOD PRESSURE: 132 MMHG | DIASTOLIC BLOOD PRESSURE: 99 MMHG | TEMPERATURE: 97.5 F

## 2021-07-12 DIAGNOSIS — R06.00 DYSPNEA: Primary | ICD-10-CM

## 2021-07-12 DIAGNOSIS — E86.0 DEHYDRATION: ICD-10-CM

## 2021-07-12 LAB
ALBUMIN SERPL BCP-MCNC: 4.4 G/DL (ref 3.5–5)
ALP SERPL-CCNC: 70 U/L (ref 46–116)
ALT SERPL W P-5'-P-CCNC: 51 U/L (ref 12–78)
ANION GAP SERPL CALCULATED.3IONS-SCNC: 13 MMOL/L (ref 4–13)
APTT PPP: 28 SECONDS (ref 23–37)
AST SERPL W P-5'-P-CCNC: 25 U/L (ref 5–45)
BASE EX.OXY STD BLDV CALC-SCNC: 72.7 % (ref 60–80)
BASE EXCESS BLDV CALC-SCNC: -0.1 MMOL/L
BASOPHILS # BLD AUTO: 0.05 THOUSANDS/ΜL (ref 0–0.1)
BASOPHILS NFR BLD AUTO: 0 % (ref 0–1)
BILIRUB SERPL-MCNC: 0.73 MG/DL (ref 0.2–1)
BUN SERPL-MCNC: 15 MG/DL (ref 5–25)
CALCIUM SERPL-MCNC: 9.7 MG/DL (ref 8.3–10.1)
CHLORIDE SERPL-SCNC: 105 MMOL/L (ref 100–108)
CO2 SERPL-SCNC: 24 MMOL/L (ref 21–32)
CREAT SERPL-MCNC: 1.33 MG/DL (ref 0.6–1.3)
D DIMER PPP FEU-MCNC: <0.27 UG/ML FEU
EOSINOPHIL # BLD AUTO: 0.09 THOUSAND/ΜL (ref 0–0.61)
EOSINOPHIL NFR BLD AUTO: 1 % (ref 0–6)
ERYTHROCYTE [DISTWIDTH] IN BLOOD BY AUTOMATED COUNT: 11.9 % (ref 11.6–15.1)
GFR SERPL CREATININE-BSD FRML MDRD: 74 ML/MIN/1.73SQ M
GLUCOSE SERPL-MCNC: 101 MG/DL (ref 65–140)
GLUCOSE SERPL-MCNC: 97 MG/DL (ref 65–140)
HCO3 BLDV-SCNC: 24.4 MMOL/L (ref 24–30)
HCT VFR BLD AUTO: 43.6 % (ref 36.5–49.3)
HGB BLD-MCNC: 15.3 G/DL (ref 12–17)
IMM GRANULOCYTES # BLD AUTO: 0.04 THOUSAND/UL (ref 0–0.2)
IMM GRANULOCYTES NFR BLD AUTO: 0 % (ref 0–2)
INR PPP: 1.01 (ref 0.84–1.19)
LACTATE SERPL-SCNC: 1.3 MMOL/L (ref 0.5–2)
LYMPHOCYTES # BLD AUTO: 2.14 THOUSANDS/ΜL (ref 0.6–4.47)
LYMPHOCYTES NFR BLD AUTO: 19 % (ref 14–44)
MCH RBC QN AUTO: 30.5 PG (ref 26.8–34.3)
MCHC RBC AUTO-ENTMCNC: 35.1 G/DL (ref 31.4–37.4)
MCV RBC AUTO: 87 FL (ref 82–98)
MONOCYTES # BLD AUTO: 0.85 THOUSAND/ΜL (ref 0.17–1.22)
MONOCYTES NFR BLD AUTO: 7 % (ref 4–12)
NEUTROPHILS # BLD AUTO: 8.28 THOUSANDS/ΜL (ref 1.85–7.62)
NEUTS SEG NFR BLD AUTO: 73 % (ref 43–75)
NRBC BLD AUTO-RTO: 0 /100 WBCS
O2 CT BLDV-SCNC: 16.4 ML/DL
PCO2 BLDV: 39.8 MM HG (ref 42–50)
PH BLDV: 7.41 [PH] (ref 7.3–7.4)
PLATELET # BLD AUTO: 326 THOUSANDS/UL (ref 149–390)
PMV BLD AUTO: 9.8 FL (ref 8.9–12.7)
PO2 BLDV: 38.6 MM HG (ref 35–45)
POTASSIUM SERPL-SCNC: 3.3 MMOL/L (ref 3.5–5.3)
PROT SERPL-MCNC: 8 G/DL (ref 6.4–8.2)
PROTHROMBIN TIME: 13.4 SECONDS (ref 11.6–14.5)
RBC # BLD AUTO: 5.02 MILLION/UL (ref 3.88–5.62)
SODIUM SERPL-SCNC: 142 MMOL/L (ref 136–145)
TROPONIN I SERPL-MCNC: <0.02 NG/ML
WBC # BLD AUTO: 11.45 THOUSAND/UL (ref 4.31–10.16)

## 2021-07-12 PROCEDURE — 93005 ELECTROCARDIOGRAM TRACING: CPT

## 2021-07-12 PROCEDURE — 71250 CT THORAX DX C-: CPT

## 2021-07-12 PROCEDURE — 83605 ASSAY OF LACTIC ACID: CPT | Performed by: EMERGENCY MEDICINE

## 2021-07-12 PROCEDURE — 87040 BLOOD CULTURE FOR BACTERIA: CPT | Performed by: EMERGENCY MEDICINE

## 2021-07-12 PROCEDURE — 85025 COMPLETE CBC W/AUTO DIFF WBC: CPT | Performed by: EMERGENCY MEDICINE

## 2021-07-12 PROCEDURE — 85379 FIBRIN DEGRADATION QUANT: CPT | Performed by: EMERGENCY MEDICINE

## 2021-07-12 PROCEDURE — 71045 X-RAY EXAM CHEST 1 VIEW: CPT

## 2021-07-12 PROCEDURE — 82805 BLOOD GASES W/O2 SATURATION: CPT | Performed by: EMERGENCY MEDICINE

## 2021-07-12 PROCEDURE — 85730 THROMBOPLASTIN TIME PARTIAL: CPT | Performed by: EMERGENCY MEDICINE

## 2021-07-12 PROCEDURE — 85610 PROTHROMBIN TIME: CPT | Performed by: EMERGENCY MEDICINE

## 2021-07-12 PROCEDURE — 82948 REAGENT STRIP/BLOOD GLUCOSE: CPT

## 2021-07-12 PROCEDURE — 84484 ASSAY OF TROPONIN QUANT: CPT | Performed by: EMERGENCY MEDICINE

## 2021-07-12 PROCEDURE — 96360 HYDRATION IV INFUSION INIT: CPT

## 2021-07-12 PROCEDURE — 36415 COLL VENOUS BLD VENIPUNCTURE: CPT

## 2021-07-12 PROCEDURE — 99285 EMERGENCY DEPT VISIT HI MDM: CPT

## 2021-07-12 PROCEDURE — 99285 EMERGENCY DEPT VISIT HI MDM: CPT | Performed by: EMERGENCY MEDICINE

## 2021-07-12 PROCEDURE — 80053 COMPREHEN METABOLIC PANEL: CPT | Performed by: EMERGENCY MEDICINE

## 2021-07-12 PROCEDURE — 96361 HYDRATE IV INFUSION ADD-ON: CPT

## 2021-07-12 RX ORDER — POTASSIUM CHLORIDE 20 MEQ/1
20 TABLET, EXTENDED RELEASE ORAL ONCE
Status: COMPLETED | OUTPATIENT
Start: 2021-07-12 | End: 2021-07-12

## 2021-07-12 RX ADMIN — POTASSIUM CHLORIDE 20 MEQ: 1500 TABLET, EXTENDED RELEASE ORAL at 22:40

## 2021-07-12 RX ADMIN — SODIUM CHLORIDE 1000 ML: 0.9 INJECTION, SOLUTION INTRAVENOUS at 22:41

## 2021-07-13 LAB
ATRIAL RATE: 63 BPM
P AXIS: 50 DEGREES
PR INTERVAL: 162 MS
QRS AXIS: 31 DEGREES
QRSD INTERVAL: 108 MS
QT INTERVAL: 392 MS
QTC INTERVAL: 401 MS
T WAVE AXIS: 23 DEGREES
VENTRICULAR RATE: 63 BPM

## 2021-07-13 PROCEDURE — 93010 ELECTROCARDIOGRAM REPORT: CPT | Performed by: INTERNAL MEDICINE

## 2021-07-13 PROCEDURE — 96361 HYDRATE IV INFUSION ADD-ON: CPT

## 2021-07-13 NOTE — ED PROVIDER NOTES
History  Chief Complaint   Patient presents with    Shortness of Breath     pt reports feeling SOB at work with asthma hx, also lightheaded thought sugar was low, type 2 diabetic     Patient is a 27year old male with sob tonight and has asthma  No smoking  (+) lightheaded and patient thought his blood sugar was low  No N/V/D  No GI bleeding  No cough  No fever  No travel  Was last seen at [facility] ED on 4/13/21 for back pain  [facility] website checked on this patient and last RX filled was on 7/28/20 for clonazepam for 30 day supply  History provided by:  Patient   used: No    Shortness of Breath  Associated symptoms: no chest pain, no cough, no fever and no vomiting        Prior to Admission Medications   Prescriptions Last Dose Informant Patient Reported? Taking?    OLANZapine (ZyPREXA) 20 MG tablet   No No   Sig: Take 1 tablet (20 mg total) by mouth daily at bedtime   atoMOXetine (STRATTERA) 40 mg capsule   No No   Sig: Take 1 capsule (40 mg total) by mouth daily   metFORMIN (GLUCOPHAGE) 500 mg tablet   No No   Sig: Take 1 tablet (500 mg total) by mouth 2 (two) times a day with meals   methocarbamol (ROBAXIN) 500 mg tablet   No No   Sig: Take 2 tablets (1,000 mg total) by mouth 4 (four) times a day for 5 days   omeprazole (PriLOSEC) 20 mg delayed release capsule   Yes No   Sig: Take 20 mg by mouth daily     ranitidine (ZANTAC) 150 MG capsule   No No   Sig: Take 1 capsule (150 mg total) by mouth 2 (two) times a day      Facility-Administered Medications: None       Past Medical History:   Diagnosis Date    ADHD (attention deficit hyperactivity disorder)     Anxiety     Diabetes mellitus (HCC)     Type 2    Foot fracture     GERD (gastroesophageal reflux disease)     Migraine        Past Surgical History:   Procedure Laterality Date    WISDOM TOOTH EXTRACTION         Family History   Problem Relation Age of Onset    Coronary artery disease Family     Diabetes Family     Hyperlipidemia Family     Hypertension Family     Migraines Mother     Diabetes Father     Diabetes Paternal Grandmother     Hypertension Paternal Grandmother     Diabetes Paternal Grandfather     Hypertension Paternal Grandfather      I have reviewed and agree with the history as documented  E-Cigarette/Vaping     E-Cigarette/Vaping Substances     Social History     Tobacco Use    Smoking status: Never Smoker    Smokeless tobacco: Never Used   Substance Use Topics    Alcohol use: No    Drug use: No       Review of Systems   Constitutional: Negative for fever  Respiratory: Positive for shortness of breath  Negative for cough  Cardiovascular: Negative for chest pain  Gastrointestinal: Negative for blood in stool, diarrhea, nausea and vomiting  Neurological: Positive for light-headedness  All other systems reviewed and are negative  Physical Exam  Physical Exam  Vitals and nursing note reviewed  Constitutional:       General: He is in acute distress (mild)  HENT:      Head: Normocephalic and atraumatic  Mouth/Throat:      Mouth: Mucous membranes are moist    Eyes:      General: No scleral icterus  Cardiovascular:      Rate and Rhythm: Normal rate and regular rhythm  Heart sounds: Normal heart sounds  No murmur heard  Pulmonary:      Effort: Pulmonary effort is normal  No respiratory distress  Breath sounds: Normal breath sounds  No stridor  No wheezing, rhonchi or rales  Abdominal:      General: Bowel sounds are normal       Palpations: Abdomen is soft  Tenderness: There is no abdominal tenderness  Musculoskeletal:         General: No deformity  Cervical back: Normal range of motion and neck supple  Right lower leg: No edema  Left lower leg: No edema  Skin:     General: Skin is warm and dry  Findings: No erythema or rash  Neurological:      General: No focal deficit present        Mental Status: He is alert and oriented to person, place, and time  Psychiatric:      Comments: Somewhat anxious  Vital Signs  ED Triage Vitals   Temperature Pulse Respirations Blood Pressure SpO2   07/12/21 2244 07/12/21 2055 07/12/21 2055 07/12/21 2055 07/12/21 2055   97 5 °F (36 4 °C) (!) 53 20 137/90 98 %      Temp Source Heart Rate Source Patient Position - Orthostatic VS BP Location FiO2 (%)   07/12/21 2244 07/12/21 2055 07/12/21 2055 07/12/21 2055 --   Oral Monitor Lying Right arm       Pain Score       --                  Vitals:    07/12/21 2055 07/12/21 2115 07/12/21 2300   BP: 137/90 120/78 132/99   Pulse: (!) 53 64 (!) 52   Patient Position - Orthostatic VS: Lying Sitting Lying         Visual Acuity      ED Medications  Medications   potassium chloride (K-DUR,KLOR-CON) CR tablet 20 mEq (20 mEq Oral Given 7/12/21 2240)   sodium chloride 0 9 % bolus 1,000 mL (1,000 mL Intravenous New Bag 7/12/21 2241)       Diagnostic Studies  Results Reviewed     Procedure Component Value Units Date/Time    Blood culture #2 [[id number]] Collected: 07/12/21 2248    Lab Status: In process Specimen: Blood from Arm, Left Updated: 07/12/21 2328    Blood culture #1 [[id number]] Collected: 07/12/21 2248    Lab Status: In process Specimen: Blood from Arm, Right Updated: 07/12/21 2328    Lactic acid [[id number]]  (Normal) Collected: 07/12/21 2248    Lab Status: Final result Specimen: Blood from Arm, Left Updated: 07/12/21 2323     LACTIC ACID 1 3 mmol/L     Narrative:      Result may be elevated if tourniquet was used during collection      D-Dimer [[id number]]  (Normal) Collected: 07/12/21 2248    Lab Status: Final result Specimen: Blood from Arm, Left Updated: 07/12/21 2319     D-Dimer, Quant <0 27 ug/ml FEU     Protime-INR [[id number]]  (Normal) Collected: 07/12/21 2248    Lab Status: Final result Specimen: Blood from Arm, Left Updated: 07/12/21 2313     Protime 13 4 seconds      INR 1 01    APTT [[id number]]  (Normal) Collected: 07/12/21 7321    Lab Status: Final result Specimen: Blood from Arm, Left Updated: 07/12/21 2313     PTT 28 seconds     Blood gas, venous [[id number]]  (Abnormal) Collected: 07/12/21 2248    Lab Status: Final result Specimen: Blood from Arm, Left Updated: 07/12/21 2303     pH, Shon 7 406     pCO2, Shon 39 8 mm Hg      pO2, Shon 38 6 mm Hg      HCO3, Shon 24 4 mmol/L      Base Excess, Shon -0 1 mmol/L      O2 Content, Shon 16 4 ml/dL      O2 HGB, VENOUS 72 7 %     Troponin I [[id number]]  (Normal) Collected: 07/12/21 2102    Lab Status: Final result Specimen: Blood from Arm, Left Updated: 07/12/21 2130     Troponin I <0 02 ng/mL     Comprehensive metabolic panel [[id number]]  (Abnormal) Collected: 07/12/21 2102    Lab Status: Final result Specimen: Blood from Arm, Left Updated: 07/12/21 2128     Sodium 142 mmol/L      Potassium 3 3 mmol/L      Chloride 105 mmol/L      CO2 24 mmol/L      ANION GAP 13 mmol/L      BUN 15 mg/dL      Creatinine 1 33 mg/dL      Glucose 101 mg/dL      Calcium 9 7 mg/dL      AST 25 U/L      ALT 51 U/L      Alkaline Phosphatase 70 U/L      Total Protein 8 0 g/dL      Albumin 4 4 g/dL      Total Bilirubin 0 73 mg/dL      eGFR 74 ml/min/1 73sq m     Narrative:      [place] guidelines for Chronic Kidney Disease (CKD):     Stage 1 with normal or high GFR (GFR > 90 mL/min/1 73 square meters)    Stage 2 Mild CKD (GFR = 60-89 mL/min/1 73 square meters)    Stage 3A Moderate CKD (GFR = 45-59 mL/min/1 73 square meters)    Stage 3B Moderate CKD (GFR = 30-44 mL/min/1 73 square meters)    Stage 4 Severe CKD (GFR = 15-29 mL/min/1 73 square meters)    Stage 5 End Stage CKD (GFR <15 mL/min/1 73 square meters)  Note: GFR calculation is accurate only with a steady state creatinine    CBC and differential [[id number]]  (Abnormal) Collected: 07/12/21 2102    Lab Status: Final result Specimen: Blood from Arm, Left Updated: 07/12/21 2109     WBC 11 45 Thousand/uL      RBC 5 02 Million/uL      Hemoglobin 15 3 g/dL      Hematocrit 43 6 %      MCV 87 fL      MCH 30 5 pg      MCHC 35 1 g/dL      RDW 11 9 %      MPV 9 8 fL      Platelets 020 Thousands/uL      nRBC 0 /100 WBCs      Neutrophils Relative 73 %      Immat GRANS % 0 %      Lymphocytes Relative 19 %      Monocytes Relative 7 %      Eosinophils Relative 1 %      Basophils Relative 0 %      Neutrophils Absolute 8 28 Thousands/µL      Immature Grans Absolute 0 04 Thousand/uL      Lymphocytes Absolute 2 14 Thousands/µL      Monocytes Absolute 0 85 Thousand/µL      Eosinophils Absolute 0 09 Thousand/µL      Basophils Absolute 0 05 Thousands/µL     Fingerstick Glucose (POCT) [[id number]]  (Normal) Collected: 07/12/21 2057    Lab Status: Final result Updated: 07/12/21 2058     POC Glucose 97 mg/dl                  CT chest without contrast   ED Interpretation by Sarah Allred MD (07/13 0131)   FINDINGS:     LUNGS:  Lungs are clear  There is no tracheal or endobronchial lesion      PLEURA:  Unremarkable      HEART/GREAT VESSELS:  Unremarkable for patient's age      MEDIASTINUM AND TRAV:  Unremarkable      CHEST WALL AND LOWER NECK:   Unremarkable      VISUALIZED STRUCTURES IN THE UPPER ABDOMEN:  Unremarkable      OSSEOUS STRUCTURES:  No acute fracture or destructive osseous lesion      IMPRESSION:     No effusion, airspace disease, or pneumothorax      Workstation performed: [id number]      Final Result by Paul Arias MD (07/13 0129)      No effusion, airspace disease, or pneumothorax  Workstation performed: [id number]         XR chest 1 view portable   ED Interpretation by Sarah Allred MD (07/12 5269)   No acute disease read by me                    Procedures  ECG 12 Lead Documentation Only    Date/Time: 7/12/2021 10:22 PM  Performed by: Sarah Allred MD  Authorized by: Sarah Allred MD     Indications / Diagnosis:  Sob  ECG reviewed by me, the ED Provider: yes    Patient location:  ED  Previous ECG:     Previous ECG:  Unavailable  Quality:     Tracing quality: Limited by artifact  Rate:     ECG rate:  63    ECG rate assessment: normal    Rhythm:     Rhythm: sinus rhythm      Rhythm comment:  S  arrhythmia  Ectopy:     Ectopy: none    QRS:     QRS axis:  Normal    QRS intervals:  Normal  Conduction:     Conduction: normal    ST segments:     ST segments:  Normal  T waves:     T waves: normal               ED Course  ED Course as of Jul 13 0133   Mon Jul 12, 2021 2227 Labs and EKG d/w patient  2227 K-dur po ordered  Potassium(!): 3 3   2227 IVFs ordered  Creatinine(!): 1 33   2340 CXR d/w patient  Tue Jul 13, 2021 0131 CT chest d/w patient                   HEART Risk Score      Most Recent Value   Heart Score Risk Calculator   History  0 Filed at: 07/12/2021 2335   ECG  0 Filed at: 07/12/2021 2335   Age  0 Filed at: 07/12/2021 2335   Risk Factors  0 Filed at: 07/12/2021 2335   Troponin  0 Filed at: 07/12/2021 2335   HEART Score  0 Filed at: 07/12/2021 2335              PERC Rule for PE      Most Recent Value   PERC Rule for PE   Age >=50  0 Filed at: 07/12/2021 2334   HR >=100  0 Filed at: 07/12/2021 2334   O2 Sat on room air < 95%  0 Filed at: 07/12/2021 2334   History of PE or DVT  0 Filed at: 07/12/2021 2334   Recent trauma or surgery  0 Filed at: 07/12/2021 2334   Hemoptysis  0 Filed at: 07/12/2021 2334   Exogenous estrogen  0 Filed at: 07/12/2021 2334   Unilateral leg swelling  0 Filed at: 07/12/2021 2334   PERC Rule for PE Results  0 Filed at: 07/12/2021 2334                  Mireille Espinosa' Criteria for PE      Most Recent Value   Giuseppe' Criteria for PE   Clinical signs and symptoms of DVT  0 Filed at: 07/12/2021 2335   PE is primary diagnosis or equally likely  3 Filed at: 07/12/2021 2335   HR >100  0 Filed at: 07/12/2021 2335   Immobilization at least 3 days or Surgery in the previous 4 weeks  0 Filed at: 07/12/2021 2335   Previous, objectively diagnosed PE or DVT  0 Filed at: 07/12/2021 2335   Hemoptysis  0 Filed at: 07/12/2021 233   Malignancy with treatment within 6 months or palliative  0 Filed at: 07/12/2021 2335   [place]' Criteria Total  3 Filed at: 07/12/2021 2335                [facility]  Number of Diagnoses or Management Options  Diagnosis management comments: DDX including but not limited to: pneumonia, pleural effusion, PE, PTX, ACS, MI, asthma exacerbation; doubt COVID 19, EVALI; anemia, metabolic abnormality, renal failure  Amount and/or Complexity of Data Reviewed  Clinical lab tests: ordered and reviewed  Tests in the radiology section of CPT®: ordered and reviewed  Decide to obtain previous medical records or to obtain history from someone other than the patient: yes  Review and summarize past medical records: yes  Independent visualization of images, tracings, or specimens: yes        Disposition  Final diagnoses:   Dyspnea   Dehydration     Time reflects when diagnosis was documented in both MDM as applicable and the Disposition within this note     Time User Action Codes Description Comment    7/12/2021 11:33 PM Thiago Room Add [R06 00] Dyspnea     7/12/2021 11:33 PM Thiago Room Add [E86 0] Dehydration       ED Disposition     ED Disposition Condition Date/Time Comment    Discharge Stable Tue Jul 13, 2021  1:32 AM Sandi Lewis discharge to home/self care  Follow-up Information     Follow up With Specialties Details Why 300 Kianna Rees MD Internal Medicine Call in 1 day Return sooner if increased difficulty breathing, fever, pain, weakness, dizziness  Use inhaler 1-2 puffs every 4 hours for difficulty breathing  [address] [facility] [address]  [phone]            Patient's Medications   Discharge Prescriptions    No medications on file     No discharge procedures on file      PDMP Review       Value Time User    PDMP Reviewed  Yes 7/12/2021 10:21 PM Elva Mascorro MD          ED Provider  Electronically Signed by           Elva Mascorro MD  07/13/21 0170

## 2021-07-18 LAB
BACTERIA BLD CULT: NORMAL
BACTERIA BLD CULT: NORMAL

## 2022-12-04 ENCOUNTER — APPOINTMENT (OUTPATIENT)
Dept: URGENT CARE | Facility: MEDICAL CENTER | Age: 27
End: 2022-12-04

## 2023-09-25 ENCOUNTER — OFFICE VISIT (OUTPATIENT)
Dept: URGENT CARE | Age: 28
End: 2023-09-25
Payer: COMMERCIAL

## 2023-09-25 VITALS
HEART RATE: 68 BPM | OXYGEN SATURATION: 98 % | TEMPERATURE: 97.9 F | BODY MASS INDEX: 28.03 KG/M2 | SYSTOLIC BLOOD PRESSURE: 129 MMHG | WEIGHT: 201 LBS | DIASTOLIC BLOOD PRESSURE: 74 MMHG | RESPIRATION RATE: 18 BRPM

## 2023-09-25 DIAGNOSIS — R11.10 VOMITING, UNSPECIFIED VOMITING TYPE, UNSPECIFIED WHETHER NAUSEA PRESENT: Primary | ICD-10-CM

## 2023-09-25 DIAGNOSIS — R10.31 RLQ ABDOMINAL PAIN: ICD-10-CM

## 2023-09-25 LAB — GLUCOSE SERPL-MCNC: 96 MG/DL (ref 65–140)

## 2023-09-25 PROCEDURE — 82948 REAGENT STRIP/BLOOD GLUCOSE: CPT

## 2023-09-25 PROCEDURE — 99213 OFFICE O/P EST LOW 20 MIN: CPT

## 2023-09-25 NOTE — PROGRESS NOTES
[facility]        NAME: Laquita Juárez is a 34 y.o. male  : 1995    MRN: [id number]  DATE: 2023  TIME: 10:14 AM      Assessment and Plan     Vomiting, unspecified vomiting type, unspecified whether nausea present [R11.10]  1. Vomiting, unspecified vomiting type, unspecified whether nausea present  Transfer to other facility    CANCELED: Covid/Flu-Office Collect      2. RLQ abdominal pain  Transfer to other facility        poct glucose 96 mg/dl. Patient agreeable to proceed to the emergency department for further evaluation- requesting to go to [facility] ER. Requesting to go to evaluation back by POV visitor at bedside to drive patient. Patient stable at this time. Patient Instructions     Proceed to the ER for further evaluation. Chief Complaint     Chief Complaint   Patient presents with   • Vomiting     Cold sweats last night. Vomit x 1 this morning. History of Present Illness     Patient is a 68-year-old male who presents with chills that started last night. Reports intermittent nausea with an episode of vomiting this morning. Reports upper abdominal pain and right lower quadrant pain. Denies cough. States he still does have his appendix. Patient is diabetic. States he is no longer taking his metformin. States he does not check his blood sugar routinely. Review of Systems     Review of Systems   Constitutional: Positive for chills. Negative for fever. HENT: Positive for congestion. Respiratory: Negative for cough. Gastrointestinal: Positive for abdominal pain, nausea and vomiting. All other systems reviewed and are negative.         Current Medications       Current Outpatient Medications:   •  atoMOXetine (STRATTERA) 40 mg capsule, Take 1 capsule (40 mg total) by mouth daily (Patient not taking: Reported on 2023), Disp: 30 capsule, Rfl: 0  •  metFORMIN (GLUCOPHAGE) 500 mg tablet, Take 1 tablet (500 mg total) by mouth 2 (two) times a day with meals (Patient not taking: Reported on 9/25/2023), Disp: 180 tablet, Rfl: 1  •  methocarbamol (ROBAXIN) 500 mg tablet, Take 2 tablets (1,000 mg total) by mouth 4 (four) times a day for 5 days, Disp: 40 tablet, Rfl: 0  •  OLANZapine (ZyPREXA) 20 MG tablet, Take 1 tablet (20 mg total) by mouth daily at bedtime (Patient not taking: Reported on 9/25/2023), Disp: 30 tablet, Rfl: 0  •  omeprazole (PriLOSEC) 20 mg delayed release capsule, Take 20 mg by mouth daily   (Patient not taking: Reported on 9/25/2023), Disp: , Rfl:   •  ranitidine (ZANTAC) 150 MG capsule, Take 1 capsule (150 mg total) by mouth 2 (two) times a day (Patient not taking: Reported on 9/25/2023), Disp: 60 capsule, Rfl: 0    Current Allergies     Allergies as of 09/25/2023 - Reviewed 09/25/2023   Allergen Reaction Noted   • Banana - food allergy Anaphylaxis 06/23/2016   • Methylphenidate Rash 06/24/2016   • Pollen extract Allergic Rhinitis 09/10/2012              The following portions of the patient's history were reviewed and updated as appropriate: allergies, current medications, past family history, past medical history, past social history, past surgical history and problem list.     Past Medical History:   Diagnosis Date   • ADHD (attention deficit hyperactivity disorder)    • Anxiety    • Diabetes mellitus ([address])     Type 2   • Foot fracture    • GERD (gastroesophageal reflux disease)    • Migraine        Past Surgical History:   Procedure Laterality Date   • WISDOM TOOTH EXTRACTION         Family History   Problem Relation Age of Onset   • Coronary artery disease Family    • Diabetes Family    • Hyperlipidemia Family    • Hypertension Family    • Migraines Mother    • Diabetes Father    • Diabetes Paternal Grandmother    • Hypertension Paternal Grandmother    • Diabetes Paternal Grandfather    • Hypertension Paternal Grandfather          Medications have been verified.         Objective     /74   Pulse 68   Temp 97.9 °F (36.6 °C) (Tympanic) Resp 18   Wt 91.2 kg (201 lb)   SpO2 98%   BMI 28.03 kg/m²   No LMP for male patient. Physical Exam     Physical Exam  Vitals and nursing note reviewed. Constitutional:       General: He is not in acute distress. Appearance: Normal appearance. He is not ill-appearing, toxic-appearing or diaphoretic. Cardiovascular:      Rate and Rhythm: Normal rate. Pulses: Normal pulses. Heart sounds: Normal heart sounds, S1 normal and S2 normal.   Pulmonary:      Effort: Pulmonary effort is normal.      Breath sounds: Normal breath sounds and air entry. No stridor, decreased air movement or transmitted upper airway sounds. No decreased breath sounds, wheezing, rhonchi or rales. Abdominal:      General: Abdomen is flat. Bowel sounds are normal.      Palpations: Abdomen is soft. Tenderness: There is abdominal tenderness in the right lower quadrant, epigastric area and periumbilical area. There is guarding. Skin:     General: Skin is warm. Capillary Refill: Capillary refill takes less than 2 seconds. Neurological:      General: No focal deficit present. Mental Status: He is alert. Psychiatric:         Mood and Affect: Mood normal.         Behavior: Behavior normal.         Thought Content:  Thought content normal.         Judgment: Judgment normal.